# Patient Record
Sex: FEMALE | Race: OTHER | Employment: FULL TIME | URBAN - METROPOLITAN AREA
[De-identification: names, ages, dates, MRNs, and addresses within clinical notes are randomized per-mention and may not be internally consistent; named-entity substitution may affect disease eponyms.]

---

## 2018-07-19 ENCOUNTER — OP HISTORICAL/CONVERTED ENCOUNTER (OUTPATIENT)
Dept: OTHER | Age: 38
End: 2018-07-19

## 2018-07-24 ENCOUNTER — OP HISTORICAL/CONVERTED ENCOUNTER (OUTPATIENT)
Dept: OTHER | Age: 38
End: 2018-07-24

## 2018-08-03 ENCOUNTER — OP HISTORICAL/CONVERTED ENCOUNTER (OUTPATIENT)
Dept: OTHER | Age: 38
End: 2018-08-03

## 2018-08-26 ENCOUNTER — ED HISTORICAL/CONVERTED ENCOUNTER (OUTPATIENT)
Dept: OTHER | Age: 38
End: 2018-08-26

## 2020-03-02 ENCOUNTER — ED HISTORICAL/CONVERTED ENCOUNTER (OUTPATIENT)
Dept: OTHER | Age: 40
End: 2020-03-02

## 2020-11-23 ENCOUNTER — APPOINTMENT (OUTPATIENT)
Dept: GENERAL RADIOLOGY | Age: 40
End: 2020-11-23
Attending: EMERGENCY MEDICINE
Payer: MEDICAID

## 2020-11-23 ENCOUNTER — HOSPITAL ENCOUNTER (EMERGENCY)
Age: 40
Discharge: HOME | End: 2020-11-23
Attending: EMERGENCY MEDICINE
Payer: MEDICAID

## 2020-11-23 VITALS
HEART RATE: 92 BPM | DIASTOLIC BLOOD PRESSURE: 85 MMHG | SYSTOLIC BLOOD PRESSURE: 111 MMHG | BODY MASS INDEX: 34.27 KG/M2 | TEMPERATURE: 97.8 F | WEIGHT: 170 LBS | HEIGHT: 59 IN | RESPIRATION RATE: 18 BRPM | OXYGEN SATURATION: 99 %

## 2020-11-23 DIAGNOSIS — M54.31 RIGHT SIDED SCIATICA: Primary | ICD-10-CM

## 2020-11-23 LAB
APPEARANCE UR: CLEAR
BACTERIA URNS QL MICRO: NEGATIVE /HPF
BILIRUB UR QL: NEGATIVE
COLOR UR: YELLOW
EPITH CASTS URNS QL MICRO: ABNORMAL /LPF
GLUCOSE UR STRIP.AUTO-MCNC: NEGATIVE MG/DL
HCG UR QL: NEGATIVE
HGB UR QL STRIP: NEGATIVE
KETONES UR QL STRIP.AUTO: NEGATIVE MG/DL
LEUKOCYTE ESTERASE UR QL STRIP.AUTO: NEGATIVE
NITRITE UR QL STRIP.AUTO: NEGATIVE
PH UR STRIP: 7 [PH] (ref 5–8)
PROT UR STRIP-MCNC: NEGATIVE MG/DL
RBC #/AREA URNS HPF: ABNORMAL /HPF (ref 0–5)
SP GR UR REFRACTOMETRY: 1.01 (ref 1–1.03)
UA: UC IF INDICATED,UAUC: ABNORMAL
UROBILINOGEN UR QL STRIP.AUTO: 0.1 EU/DL (ref 0.2–1)
WBC URNS QL MICRO: ABNORMAL /HPF (ref 0–4)

## 2020-11-23 PROCEDURE — 74011250636 HC RX REV CODE- 250/636: Performed by: EMERGENCY MEDICINE

## 2020-11-23 PROCEDURE — 99283 EMERGENCY DEPT VISIT LOW MDM: CPT

## 2020-11-23 PROCEDURE — 72100 X-RAY EXAM L-S SPINE 2/3 VWS: CPT

## 2020-11-23 PROCEDURE — 81001 URINALYSIS AUTO W/SCOPE: CPT

## 2020-11-23 PROCEDURE — 81025 URINE PREGNANCY TEST: CPT

## 2020-11-23 PROCEDURE — 96372 THER/PROPH/DIAG INJ SC/IM: CPT

## 2020-11-23 RX ORDER — CYCLOBENZAPRINE HCL 5 MG
5 TABLET ORAL
Qty: 12 TAB | Refills: 0 | Status: SHIPPED | OUTPATIENT
Start: 2020-11-23

## 2020-11-23 RX ORDER — KETOROLAC TROMETHAMINE 30 MG/ML
60 INJECTION, SOLUTION INTRAMUSCULAR; INTRAVENOUS
Status: COMPLETED | OUTPATIENT
Start: 2020-11-23 | End: 2020-11-23

## 2020-11-23 RX ORDER — NAPROXEN 500 MG/1
500 TABLET ORAL 2 TIMES DAILY WITH MEALS
Qty: 20 TAB | Refills: 0 | Status: SHIPPED | OUTPATIENT
Start: 2020-11-23 | End: 2020-12-03

## 2020-11-23 RX ORDER — CYCLOBENZAPRINE HCL 10 MG
10 TABLET ORAL
Status: DISCONTINUED | OUTPATIENT
Start: 2020-11-23 | End: 2020-11-23

## 2020-11-23 RX ORDER — METHYLPREDNISOLONE 4 MG/1
TABLET ORAL
Qty: 1 DOSE PACK | Refills: 0 | Status: SHIPPED | OUTPATIENT
Start: 2020-11-23

## 2020-11-23 RX ADMIN — KETOROLAC TROMETHAMINE 60 MG: 30 INJECTION, SOLUTION INTRAMUSCULAR at 07:04

## 2020-11-23 NOTE — ED TRIAGE NOTES
Pt states pain in lower back onset Thursday evening.  Denies dysuria, hurts worse upon position change and movement

## 2020-11-23 NOTE — LETTER
[address] [place] [address] 
[phone] Work/School Note Date: 11/23/2020 To Whom It May concern: 
 
Eloy Obando was seen and treated today in the emergency room. Eloy Obando may return to work 11/25/20.   
 
Sincerely, 
 
Dr. Yolande Machado

## 2020-11-23 NOTE — LETTER
[address] [address] [place] [id number] [phone] Work/School Note Date: 11/23/2020 To Whom It May concern: 
 
Salena Merida was seen and treated today in the emergency room. Salena Merida may return to work 11/24/20 Dr. Sheila Peck
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
## 2020-11-23 NOTE — DISCHARGE INSTRUCTIONS
Take naproxen (pain relief), flexeril (muscle relaxer), and medrol dose pack (steroids). See PCP if not better in one week. Rest from heavy lifting. Still walk for exercise. Prolonged bed rest can make pain worse. Return to ED if you cannot walk, having numbness/weakness in the leg or groin, have difficulty urinating.

## 2021-06-29 ENCOUNTER — APPOINTMENT (OUTPATIENT)
Dept: CT IMAGING | Age: 41
End: 2021-06-29
Attending: FAMILY MEDICINE
Payer: MEDICAID

## 2021-06-29 ENCOUNTER — HOSPITAL ENCOUNTER (EMERGENCY)
Age: 41
Discharge: HOME | End: 2021-06-29
Attending: FAMILY MEDICINE
Payer: MEDICAID

## 2021-06-29 VITALS
TEMPERATURE: 98.9 F | RESPIRATION RATE: 16 BRPM | BODY MASS INDEX: 38.71 KG/M2 | DIASTOLIC BLOOD PRESSURE: 71 MMHG | SYSTOLIC BLOOD PRESSURE: 104 MMHG | HEART RATE: 98 BPM | WEIGHT: 192 LBS | HEIGHT: 59 IN | OXYGEN SATURATION: 95 %

## 2021-06-29 DIAGNOSIS — M51.26 LUMBAR DISC HERNIATION: ICD-10-CM

## 2021-06-29 DIAGNOSIS — M51.37 DEGENERATIVE DISC DISEASE AT L5-S1 LEVEL: Primary | ICD-10-CM

## 2021-06-29 PROCEDURE — 74011250637 HC RX REV CODE- 250/637: Performed by: FAMILY MEDICINE

## 2021-06-29 PROCEDURE — 72131 CT LUMBAR SPINE W/O DYE: CPT

## 2021-06-29 PROCEDURE — 99283 EMERGENCY DEPT VISIT LOW MDM: CPT

## 2021-06-29 RX ORDER — HYDROCODONE BITARTRATE AND ACETAMINOPHEN 5; 325 MG/1; MG/1
1 TABLET ORAL ONCE
Status: COMPLETED | OUTPATIENT
Start: 2021-06-29 | End: 2021-06-29

## 2021-06-29 RX ORDER — NAPROXEN 500 MG/1
500 TABLET ORAL ONCE
Status: DISCONTINUED | OUTPATIENT
Start: 2021-06-29 | End: 2021-06-29 | Stop reason: HOSPADM

## 2021-06-29 RX ORDER — PREDNISONE 20 MG/1
40 TABLET ORAL DAILY
Qty: 10 TABLET | Refills: 0 | Status: SHIPPED | OUTPATIENT
Start: 2021-06-29 | End: 2021-07-04

## 2021-06-29 RX ORDER — HYDROCODONE BITARTRATE AND ACETAMINOPHEN 5; 325 MG/1; MG/1
1 TABLET ORAL
Qty: 5 TABLET | Refills: 0 | Status: SHIPPED | OUTPATIENT
Start: 2021-06-29 | End: 2021-07-01

## 2021-06-29 RX ADMIN — HYDROCODONE BITARTRATE AND ACETAMINOPHEN 1 TABLET: 5; 325 TABLET ORAL at 13:39

## 2021-06-29 NOTE — ED NOTES
MD ordered Naproxen for pain. Pt refused med, stated that naproxen does not work for her, and asked for something stronger.  Will notify MD of pt request.

## 2021-06-29 NOTE — DISCHARGE INSTRUCTIONS
Thank you! Thank you for allowing me to care for you in the emergency department. I sincerely hope that you are satisfied with your visit today. It is my goal to provide you with excellent care. Below you will find a list of your labs and imaging from your visit today. Should you have any questions regarding these results please do not hesitate to call the emergency department. Labs -   No results found for this or any previous visit (from the past 12 hour(s)). Radiologic Studies -   CT SPINE LUMB WO CONT   Final Result   Mild degenerative disc disease L5-S1 with focal disc protrusion on   the left with possible indentation of the left S1 nerve in the left lateral   recess. Mild degenerative disc disease L4-L5 with mild asymmetric disc protrusion on the   right. No acute or chronic fracture, spondylolysis or spondylolisthesis was   demonstrated. CT Results  (Last 48 hours)                 06/29/21 1417  CT SPINE LUMB WO CONT Final result    Impression:  Mild degenerative disc disease L5-S1 with focal disc protrusion on   the left with possible indentation of the left S1 nerve in the left lateral   recess. Mild degenerative disc disease L4-L5 with mild asymmetric disc protrusion on the   right. No acute or chronic fracture, spondylolysis or spondylolisthesis was   demonstrated. Narrative:  CT SCAN OF THE LUMBAR SPINE WITHOUT CONTRAST:       Clinical History:  Sudden low back pain. Thin axial and coronal and sagittal reconstructions were obtained . Dose Reduction:        All CT scans at this facility are performed using dose reduction optimization   techniques as appropriate to a performed exam including the following: Automated   exposure control, adjustments of the mA and/or kV according to patient size, or   use of iterative reconstruction technique. At the L1-L2 level , mild narrowing of the disc space is noted.  No disc   herniation or central or foraminal stenoses are noted. At the L2-L3 level , mild narrowing of the disc space is noted. No disc   herniation or central or foraminal stenoses are noted. At the L3-L4 level , mild bulging of the disc is noted. No significant central   or foraminal stenoses is noted            At the L4-L5 level , narrowing of the disc space and bulging of the disc is   noted. Mild asymmetric disc protrusion on the right is causing mild foraminal   stenosis. No significant central stenoses is noted. At the L5-S1 level , desiccation and narrowing of the disc space is noted. Bulging disc and mild focal disc protrusion appears to be indenting the left S1   nerve in the left lateral recess and clinical correlation is suggested. No   significant foraminal stenoses is noted. No acute or chronic fracture, spondylolysis or spondylolisthesis is noted. CXR Results  (Last 48 hours)      None               If you feel that you have not received excellent quality care or timely care, please ask to speak to the nurse manager. Please choose us in the future for your continued health care needs. ------------------------------------------------------------------------------------------------------------  The exam and treatment you received in the Emergency Department were for an urgent problem and are not intended as complete care. It is important that you follow-up with a doctor, nurse practitioner, or physician assistant to:  (1) confirm your diagnosis,  (2) re-evaluation of changes in your illness and treatment, and  (3) for ongoing care. If your symptoms become worse or you do not improve as expected and you are unable to reach your usual health care provider, you should return to the Emergency Department. We are available 24 hours a day. Please take your discharge instructions with you when you go to your follow-up appointment.      If you have any problem arranging a follow-up appointment, contact the Emergency Department immediately. If a prescription has been provided, please have it filled as soon as possible to prevent a delay in treatment. Read the entire medication instruction sheet provided to you by the pharmacy. If you have any questions or reservations about taking the medication due to side effects or interactions with other medications, please call your primary care physician or contact the ER to speak with the charge nurse. Make an appointment with your family doctor or the physician you were referred to for follow-up of this visit as instructed on your discharge paperwork, as this is a mandatory follow-up. Return to the ER if you are unable to be seen or if you are unable to be seen in a timely manner. If you have any problem arranging the follow-up visit, contact the Emergency Department immediately.

## 2021-06-29 NOTE — LETTER
[address] [facility] EMERGENCY DEPARTMENT  [address] [place] [address]  [phone]    Work/School Note    Date: 6/29/2021    To Whom It May concern:    Dwight Solano was seen and treated today in the emergency room by the following provider(s):  Attending Provider: Danilo Ivan DO. Dwight Solano is excused from work/school on 6/29/2021 through 7/2/2021. She is medically clear to return to work/school on 7/3/2021.         Sincerely,          Leeanna Chauhan DO

## 2021-06-29 NOTE — ED TRIAGE NOTES
Pt c/o lower back pain since this am. States that she was sweeping and bent over and heard a \"pop\". Pt states that pain also radiates down right leg.

## 2021-07-02 NOTE — ED PROVIDER NOTES
EMERGENCY DEPARTMENT HISTORY AND PHYSICAL EXAM      Date: 2021  Patient Name: Ryne Trinh    History of Presenting Illness     Chief Complaint   Patient presents with    Back Pain       History Provided By:     HPI: Ryne Trinh, is an extremely pleasant 46 y.o. female presenting to the ED with a chief complaint of lower back pain. She states she has lower back pain every day however earlier she was sweeping when she felt an immediate pain in her lower back. She states is very painful when she walks. Diffuse tenderness that is achy in the lower back. No numbness, tingling or weakness in lower extremities. No saddle anesthesia. No changes with urinating or stooling. No fevers, chills. There are no other complaints, changes, or physical findings at this time. PCP: Anya, MD Sharon    No current facility-administered medications on file prior to encounter. Current Outpatient Medications on File Prior to Encounter   Medication Sig Dispense Refill    cyclobenzaprine (FLEXERIL) 5 mg tablet Take 1 Tab by mouth three (3) times daily as needed for Muscle Spasm(s) (drowsy, do not drive after). 12 Tab 0    methylPREDNISolone (Medrol, John,) 4 mg tablet Take according to package instructions 1 Dose Pack 0       Past History     Past Medical History:  Past Medical History:   Diagnosis Date    Disc disorder        Past Surgical History:  Past Surgical History:   Procedure Laterality Date    HX  SECTION      HX TUBAL LIGATION         Family History:  History reviewed. No pertinent family history.     Social History:  Social History     Tobacco Use    Smoking status: Current Every Day Smoker     Packs/day: 0.50    Smokeless tobacco: Never Used   Substance Use Topics    Alcohol use: Never    Drug use: Never       Allergies:  No Known Allergies      Review of Systems     Review of Systems   Constitutional: Negative for activity change, appetite change, chills, fatigue and fever.   HENT: Negative for congestion and sore throat. Eyes: Negative for photophobia and visual disturbance. Respiratory: Negative for cough, shortness of breath and wheezing. Cardiovascular: Negative for chest pain, palpitations and leg swelling. Gastrointestinal: Negative for abdominal pain, diarrhea, nausea and vomiting. Endocrine: Negative for cold intolerance and heat intolerance. Musculoskeletal: Negative for gait problem and joint swelling. See HPI   Skin: Negative for color change and rash. Neurological: Negative for dizziness and headaches. See HPI       Physical Exam     Physical Exam  Constitutional:       Appearance: She is well-developed. HENT:      Head: Normocephalic and atraumatic. Mouth/Throat:      Mouth: Mucous membranes are moist.      Pharynx: Oropharynx is clear. Eyes:      Conjunctiva/sclera: Conjunctivae normal.      Pupils: Pupils are equal, round, and reactive to light. Cardiovascular:      Rate and Rhythm: Normal rate and regular rhythm. Heart sounds: No murmur heard. Pulmonary:      Effort: No respiratory distress. Breath sounds: No stridor. No wheezing, rhonchi or rales. Abdominal:      General: There is no distension. Tenderness: There is no abdominal tenderness. There is no rebound. Musculoskeletal:      Cervical back: Normal range of motion and neck supple. Comments: Mild midline lumbar tenderness as well as lumbar paraspinal muscle tenderness. Muscle strength testing at lower extremities is 5/5   Skin:     General: Skin is warm and dry. Neurological:      General: No focal deficit present. Mental Status: She is alert and oriented to person, place, and time.       Comments: No motor nor sensory deficits of lower extremities, antalgic gait   Psychiatric:         Mood and Affect: Mood normal.         Behavior: Behavior normal.         Lab and Diagnostic Study Results     Labs -   No results found for this or any previous visit (from the past 12 hour(s)). Radiologic Studies -   @lastxrresult@  CT Results  (Last 48 hours)    None        CXR Results  (Last 48 hours)    None            Medical Decision Making   - I am the first provider for this patient. - I reviewed the vital signs, available nursing notes, past medical history, past surgical history, family history and social history. - Initial assessment performed. The patients presenting problems have been discussed, and they are in agreement with the care plan formulated and outlined with them. I have encouraged them to ask questions as they arise throughout their visit. Vital Signs-Reviewed the patient's vital signs. No data found. ED Course/ Provider Notes (Medical Decision Making):     Patient presented to the emergency department with a chief complaint of lower back pain. Exam findings per above. CT demonstrates Mild degenerative disc disease L5-S1 with focal disc protrusion on  the left with possible indentation of the left S1 nerve in the left lateral  recess.     Mild degenerative disc disease L4-L5 with mild asymmetric disc protrusion on the  right.     No acute or chronic fracture, spondylolysis or spondylolisthesis was  Demonstrated. I discussed these findings in detail with the patient. She is feeling much better with pain meds. She was given orthopedic spine information for follow-up. She will also follow-up with her PCP. Jeremi Bean was given a thorough list of signs and symptoms that would warrant an immediate return to the emergency department. Otherwise Jeremi Bean will follow up with PCP. Procedures   Medical Decision Makingedical Decision Making  Performed by: Amor Mccoy DO  Procedures  None       Disposition   Disposition:     Home     All of the diagnostic tests were reviewed and questions answered. Diagnosis, care plan and treatment options were discussed.   The patient understands the instructions and will follow up as directed. The patients results have been reviewed with them. They have been counseled regarding their diagnosis. The patient verbally convey understanding and agreement of the signs, symptoms, diagnosis, treatment and prognosis and additionally agrees to follow up as recommended with their PCP in 24 - 48 hours. They also agree with the care-plan and convey that all of their questions have been answered. I have also put together some discharge instructions for them that include: 1) educational information regarding their diagnosis, 2) how to care for their diagnosis at home, as well a 3) list of reasons why they would want to return to the ED prior to their follow-up appointment, should their condition change. DISCHARGE PLAN:    1. Cannot display discharge medications since this patient is not currently admitted. 2.   Follow-up Information     Follow up With Specialties Details Why Contact Info    Scooter Griffin MD Orthopedic Surgery Schedule an appointment as soon as possible for a visit   [place] [address]  [phone]      Your primary care doctor  Schedule an appointment as soon as possible for a visit               3.  Return to ED if worse       4. Discharge Medication List as of 6/29/2021  3:17 PM      START taking these medications    Details   predniSONE (DELTASONE) 20 mg tablet Take 40 mg by mouth daily for 5 days. With Breakfast, Normal, Disp-10 Tablet, R-0      HYDROcodone-acetaminophen (Norco) 5-325 mg per tablet Take 1 Tablet by mouth every eight (8) hours as needed for Pain for up to 2 days. Max Daily Amount: 3 Tablets., Normal, Disp-5 Tablet, R-0         CONTINUE these medications which have NOT CHANGED    Details   cyclobenzaprine (FLEXERIL) 5 mg tablet Take 1 Tab by mouth three (3) times daily as needed for Muscle Spasm(s) (drowsy, do not drive after). , Normal, Disp-12 Tab,R-0 methylPREDNISolone (Medrol, John,) 4 mg tablet Take according to package instructions, Normal, Disp-1 Dose Pack,R-0               Diagnosis     Clinical Impression:    1. Degenerative disc disease at L5-S1 level    2. Lumbar disc herniation        Attestations:    Tish Ptitman, DO    Please note that this dictation was completed with [employer], the computer voice recognition software. Quite often unanticipated grammatical, syntax, homophones, and other interpretive errors are inadvertently transcribed by the computer software. Please disregard these errors. Please excuse any errors that have escaped final proofreading. Thank you.

## 2022-03-04 ENCOUNTER — HOSPITAL ENCOUNTER (EMERGENCY)
Age: 42
Discharge: HOME | End: 2022-03-04
Payer: MEDICAID

## 2022-03-04 ENCOUNTER — APPOINTMENT (OUTPATIENT)
Dept: ULTRASOUND IMAGING | Age: 42
End: 2022-03-04
Attending: NURSE PRACTITIONER
Payer: MEDICAID

## 2022-03-04 VITALS
TEMPERATURE: 98.6 F | OXYGEN SATURATION: 100 % | HEIGHT: 59 IN | BODY MASS INDEX: 38.3 KG/M2 | WEIGHT: 190 LBS | DIASTOLIC BLOOD PRESSURE: 78 MMHG | SYSTOLIC BLOOD PRESSURE: 112 MMHG | HEART RATE: 74 BPM | RESPIRATION RATE: 18 BRPM

## 2022-03-04 DIAGNOSIS — Z32.01 POSITIVE BLOOD PREGNANCY TEST: Primary | ICD-10-CM

## 2022-03-04 LAB
ABO + RH BLD: NORMAL
ALBUMIN SERPL-MCNC: 4.1 G/DL (ref 3.5–5)
ALBUMIN/GLOB SERPL: 1 {RATIO} (ref 1.1–2.2)
ALP SERPL-CCNC: 144 U/L (ref 45–117)
ALT SERPL-CCNC: 38 U/L (ref 12–78)
ANION GAP SERPL CALC-SCNC: 5 MMOL/L (ref 5–15)
APPEARANCE UR: ABNORMAL
AST SERPL W P-5'-P-CCNC: 18 U/L (ref 15–37)
BACTERIA URNS QL MICRO: NEGATIVE /HPF
BASOPHILS # BLD: 0 K/UL (ref 0–0.1)
BASOPHILS NFR BLD: 0 % (ref 0–1)
BILIRUB SERPL-MCNC: 0.5 MG/DL (ref 0.2–1)
BILIRUB UR QL: NEGATIVE
BUN SERPL-MCNC: 13 MG/DL (ref 6–20)
BUN/CREAT SERPL: 18 (ref 12–20)
CA-I BLD-MCNC: 9.5 MG/DL (ref 8.5–10.1)
CHLORIDE SERPL-SCNC: 104 MMOL/L (ref 97–108)
CO2 SERPL-SCNC: 26 MMOL/L (ref 21–32)
COLOR UR: ABNORMAL
CREAT SERPL-MCNC: 0.74 MG/DL (ref 0.55–1.02)
DIFFERENTIAL METHOD BLD: NORMAL
EOSINOPHIL # BLD: 0.1 K/UL (ref 0–0.4)
EOSINOPHIL NFR BLD: 1 % (ref 0–7)
ERYTHROCYTE [DISTWIDTH] IN BLOOD BY AUTOMATED COUNT: 12.3 % (ref 11.5–14.5)
GLOBULIN SER CALC-MCNC: 4.1 G/DL (ref 2–4)
GLUCOSE SERPL-MCNC: 103 MG/DL (ref 65–100)
GLUCOSE UR STRIP.AUTO-MCNC: NEGATIVE MG/DL
HCG SERPL-ACNC: 7 MIU/ML (ref 0–6)
HCG UR QL: NEGATIVE
HCT VFR BLD AUTO: 42.8 % (ref 35–47)
HGB BLD-MCNC: 14.3 G/DL (ref 11.5–16)
HGB UR QL STRIP: NEGATIVE
IMM GRANULOCYTES # BLD AUTO: 0 K/UL (ref 0–0.04)
IMM GRANULOCYTES NFR BLD AUTO: 0 % (ref 0–0.5)
KETONES UR QL STRIP.AUTO: NEGATIVE MG/DL
LEUKOCYTE ESTERASE UR QL STRIP.AUTO: NEGATIVE
LYMPHOCYTES # BLD: 2.2 K/UL (ref 0.8–3.5)
LYMPHOCYTES NFR BLD: 21 % (ref 12–49)
MCH RBC QN AUTO: 30.2 PG (ref 26–34)
MCHC RBC AUTO-ENTMCNC: 33.4 G/DL (ref 30–36.5)
MCV RBC AUTO: 90.3 FL (ref 80–99)
MONOCYTES # BLD: 0.6 K/UL (ref 0–1)
MONOCYTES NFR BLD: 5 % (ref 5–13)
NEUTS SEG # BLD: 7.6 K/UL (ref 1.8–8)
NEUTS SEG NFR BLD: 73 % (ref 32–75)
NITRITE UR QL STRIP.AUTO: NEGATIVE
NRBC # BLD: 0 K/UL (ref 0–0.01)
NRBC BLD-RTO: 0 PER 100 WBC
PH UR STRIP: 6 [PH] (ref 5–8)
PLATELET # BLD AUTO: 275 K/UL (ref 150–400)
PMV BLD AUTO: 11.1 FL (ref 8.9–12.9)
POTASSIUM SERPL-SCNC: 3.8 MMOL/L (ref 3.5–5.1)
PROT SERPL-MCNC: 8.2 G/DL (ref 6.4–8.2)
PROT UR STRIP-MCNC: NEGATIVE MG/DL
RBC # BLD AUTO: 4.74 M/UL (ref 3.8–5.2)
RBC #/AREA URNS HPF: ABNORMAL /HPF (ref 0–5)
SODIUM SERPL-SCNC: 135 MMOL/L (ref 136–145)
SP GR UR REFRACTOMETRY: 1 (ref 1–1.03)
UA: UC IF INDICATED,UAUC: ABNORMAL
UROBILINOGEN UR QL STRIP.AUTO: 0.1 EU/DL (ref 0.1–1)
WBC # BLD AUTO: 10.5 K/UL (ref 3.6–11)
WBC URNS QL MICRO: ABNORMAL /HPF (ref 0–4)

## 2022-03-04 PROCEDURE — 76830 TRANSVAGINAL US NON-OB: CPT

## 2022-03-04 PROCEDURE — 74011250636 HC RX REV CODE- 250/636: Performed by: NURSE PRACTITIONER

## 2022-03-04 PROCEDURE — 86900 BLOOD TYPING SEROLOGIC ABO: CPT

## 2022-03-04 PROCEDURE — 99284 EMERGENCY DEPT VISIT MOD MDM: CPT

## 2022-03-04 PROCEDURE — 81001 URINALYSIS AUTO W/SCOPE: CPT

## 2022-03-04 PROCEDURE — 81025 URINE PREGNANCY TEST: CPT

## 2022-03-04 PROCEDURE — 80053 COMPREHEN METABOLIC PANEL: CPT

## 2022-03-04 PROCEDURE — 36415 COLL VENOUS BLD VENIPUNCTURE: CPT

## 2022-03-04 PROCEDURE — 84702 CHORIONIC GONADOTROPIN TEST: CPT

## 2022-03-04 PROCEDURE — 85025 COMPLETE CBC W/AUTO DIFF WBC: CPT

## 2022-03-04 PROCEDURE — 76856 US EXAM PELVIC COMPLETE: CPT

## 2022-03-04 PROCEDURE — 96374 THER/PROPH/DIAG INJ IV PUSH: CPT

## 2022-03-04 RX ORDER — ONDANSETRON 2 MG/ML
4 INJECTION INTRAMUSCULAR; INTRAVENOUS
Status: COMPLETED | OUTPATIENT
Start: 2022-03-04 | End: 2022-03-04

## 2022-03-04 RX ADMIN — SODIUM CHLORIDE 1000 ML: 9 INJECTION, SOLUTION INTRAVENOUS at 12:30

## 2022-03-04 RX ADMIN — ONDANSETRON 4 MG: 2 INJECTION INTRAMUSCULAR; INTRAVENOUS at 12:31

## 2022-03-04 NOTE — ED PROVIDER NOTES
EMERGENCY DEPARTMENT HISTORY AND PHYSICAL EXAM      Date: 3/4/2022  Patient Name: Fercho Ely    History of Presenting Illness     Chief Complaint   Patient presents with    Abdominal Pain       History Provided By: Patient    HPI: Fercho Ely, 46 y.o. female with a past medical history significant hyperlipidemia and obesity presents to the ED with cc of positive pregnancy test.  Patient states she had her tubes tied 8 years ago. Patient went to her family practice who found her to have a positive blood pressure pregnancy test.  Patient was then told to come to the ER to evaluate positive pregnancy. Patient has not been sexually active in over 2 months. Moderate severity, no known exacerbating or relieving factors, no other associated signs and symptoms    There are no other complaints, changes, or physical findings at this time. PCP: None    No current facility-administered medications on file prior to encounter. Current Outpatient Medications on File Prior to Encounter   Medication Sig Dispense Refill    cyclobenzaprine (FLEXERIL) 5 mg tablet Take 1 Tab by mouth three (3) times daily as needed for Muscle Spasm(s) (drowsy, do not drive after). 12 Tab 0    methylPREDNISolone (Medrol, John,) 4 mg tablet Take according to package instructions 1 Dose Pack 0       Past History     Past Medical History:  Past Medical History:   Diagnosis Date    Disc disorder        Past Surgical History:  Past Surgical History:   Procedure Laterality Date    HX  SECTION      HX TUBAL LIGATION         Family History:  No family history on file.     Social History:  Social History     Tobacco Use    Smoking status: Current Every Day Smoker     Packs/day: 0.50    Smokeless tobacco: Never Used   Substance Use Topics    Alcohol use: Never    Drug use: Never       Allergies:  No Known Allergies      Review of Systems     Review of Systems   Constitutional: Negative for chills, fatigue and fever.   HENT: Negative for congestion, sinus pressure and trouble swallowing. Eyes: Negative for photophobia and pain. Respiratory: Negative for cough and shortness of breath. Cardiovascular: Negative for chest pain and leg swelling. Gastrointestinal: Negative for abdominal pain, diarrhea, nausea and vomiting. Endocrine: Negative for polydipsia, polyphagia and polyuria. Genitourinary: Negative for decreased urine volume, difficulty urinating, dysuria, hematuria and urgency. Musculoskeletal: Negative for back pain, gait problem, myalgias and neck pain. Skin: Negative for pallor and rash. Allergic/Immunologic: Negative for environmental allergies and food allergies. Neurological: Negative for dizziness, facial asymmetry, speech difficulty, numbness and headaches. Hematological: Negative for adenopathy. Does not bruise/bleed easily. Psychiatric/Behavioral: Negative for agitation, self-injury and suicidal ideas. The patient is not nervous/anxious. Physical Exam     Physical Exam  Vitals and nursing note reviewed. Constitutional:       Appearance: Normal appearance. HENT:      Head: Atraumatic. Right Ear: Tympanic membrane and external ear normal.      Left Ear: Tympanic membrane and external ear normal.      Nose: Nose normal.      Mouth/Throat:      Mouth: Mucous membranes are moist.   Eyes:      Extraocular Movements: Extraocular movements intact. Pupils: Pupils are equal, round, and reactive to light. Cardiovascular:      Rate and Rhythm: Normal rate and regular rhythm. Pulses: Normal pulses. Heart sounds: Normal heart sounds. Pulmonary:      Breath sounds: Normal breath sounds. Abdominal:      General: Abdomen is flat. Bowel sounds are normal.      Palpations: Abdomen is soft. Tenderness: There is generalized abdominal tenderness. Musculoskeletal:         General: Normal range of motion. Cervical back: Normal range of motion and neck supple. Skin:     General: Skin is warm and dry. Capillary Refill: Capillary refill takes less than 2 seconds. Neurological:      General: No focal deficit present. Mental Status: She is alert and oriented to person, place, and time. Mental status is at baseline. Psychiatric:         Mood and Affect: Mood normal.         Behavior: Behavior normal.         Lab and Diagnostic Study Results     Labs -     Recent Results (from the past 12 hour(s))   BLOOD TYPE, (ABO+RH)    Collection Time: 03/04/22 12:04 PM   Result Value Ref Range    ABO/Rh(D) O Positive    METABOLIC PANEL, COMPREHENSIVE    Collection Time: 03/04/22 12:04 PM   Result Value Ref Range    Sodium 135 (L) 136 - 145 mmol/L    Potassium 3.8 3.5 - 5.1 mmol/L    Chloride 104 97 - 108 mmol/L    CO2 26 21 - 32 mmol/L    Anion gap 5 5 - 15 mmol/L    Glucose 103 (H) 65 - 100 mg/dL    BUN 13 6 - 20 mg/dL    Creatinine 0.74 0.55 - 1.02 mg/dL    BUN/Creatinine ratio 18 12 - 20      GFR est AA >60 >60 ml/min/1.73m2    GFR est non-AA >60 >60 ml/min/1.73m2    Calcium 9.5 8.5 - 10.1 mg/dL    Bilirubin, total 0.5 0.2 - 1.0 mg/dL    AST (SGOT) 18 15 - 37 U/L    ALT (SGPT) 38 12 - 78 U/L    Alk. phosphatase 144 (H) 45 - 117 U/L    Protein, total 8.2 6.4 - 8.2 g/dL    Albumin 4.1 3.5 - 5.0 g/dL    Globulin 4.1 (H) 2.0 - 4.0 g/dL    A-G Ratio 1.0 (L) 1.1 - 2.2     CBC WITH AUTOMATED DIFF    Collection Time: 03/04/22 12:04 PM   Result Value Ref Range    WBC 10.5 3.6 - 11.0 K/uL    RBC 4.74 3.80 - 5.20 M/uL    HGB 14.3 11.5 - 16.0 g/dL    HCT 42.8 35.0 - 47.0 %    MCV 90.3 80.0 - 99.0 FL    MCH 30.2 26.0 - 34.0 PG    MCHC 33.4 30.0 - 36.5 g/dL    RDW 12.3 11.5 - 14.5 %    PLATELET 173 056 - 501 K/uL    MPV 11.1 8.9 - 12.9 FL    NRBC 0.0 0.0  WBC    ABSOLUTE NRBC 0.00 0.00 - 0.01 K/uL    NEUTROPHILS 73 32 - 75 %    LYMPHOCYTES 21 12 - 49 %    MONOCYTES 5 5 - 13 %    EOSINOPHILS 1 0 - 7 %    BASOPHILS 0 0 - 1 %    IMMATURE GRANULOCYTES 0 0 - 0.5 %    ABS. NEUTROPHILS 7.6 1.8 - 8.0 K/UL    ABS. LYMPHOCYTES 2.2 0.8 - 3.5 K/UL    ABS. MONOCYTES 0.6 0.0 - 1.0 K/UL    ABS. EOSINOPHILS 0.1 0.0 - 0.4 K/UL    ABS. BASOPHILS 0.0 0.0 - 0.1 K/UL    ABS. IMM. GRANS. 0.0 0.00 - 0.04 K/UL    DF AUTOMATED     BETA HCG, QT    Collection Time: 03/04/22 12:04 PM   Result Value Ref Range    Beta HCG, QT 7.0 (H) 0 - 6 mIU/mL   URINALYSIS W/ REFLEX CULTURE    Collection Time: 03/04/22 12:08 PM    Specimen: Urine   Result Value Ref Range    Color Yellow/Straw      Appearance Turbid (A) Clear      Specific gravity 1.005 1.003 - 1.030      pH (UA) 6.0 5.0 - 8.0      Protein Negative Negative mg/dL    Glucose Negative Negative mg/dL    Ketone Negative Negative mg/dL    Bilirubin Negative Negative      Blood Negative Negative      Urobilinogen 0.1 0.1 - 1.0 EU/dL    Nitrites Negative Negative      Leukocyte Esterase Negative Negative      UA:UC IF INDICATED Culture not indicated by UA result Culture not indicated by UA result      WBC 0-4 0 - 4 /hpf    RBC 0-5 0 - 5 /hpf    Bacteria Negative Negative /hpf   HCG URINE, QL    Collection Time: 03/04/22  2:30 PM   Result Value Ref Range    HCG urine, QL Negative Negative         Radiologic Studies -   @[email or web address]@  CT Results  (Last 48 hours)    None        CXR Results  (Last 48 hours)    None            Medical Decision Making   - I am the first provider for this patient. - I reviewed the vital signs, available nursing notes, past medical history, past surgical history, family history and social history. - Initial assessment performed. The patients presenting problems have been discussed, and they are in agreement with the care plan formulated and outlined with them. I have encouraged them to ask questions as they arise throughout their visit. Vital Signs-Reviewed the patient's vital signs.   Patient Vitals for the past 12 hrs:   Temp Pulse Resp BP SpO2   03/04/22 1619 98.6 °F (37 °C) 74 18 112/78 100 %   03/04/22 1150 98.8 °F (37.1 °C) 73 16 116/85 99 %       Records Reviewed: Nursing Notes and Old Medical Records          ED Course:   Discussed case with OB/GYN. In the setting of a positive serum pregnancy and a negative urine pregnancy patient will follow up outpatient later with OB/GYN. Provider Notes (Medical Decision Making):   Patient presents with a positive pregnancy. Differential diagnosis include pregnancy, ectopic pregnancy, failed pregnancy, threatened . Case was discussed with OB/GYN who will follow her in the office. MDM       Procedures   Medical Decision Makingedical Decision Making  Performed by: Nova Ma NP  PROCEDURES:  Procedures       Disposition   Disposition: DC- Adult Discharges: All of the diagnostic tests were reviewed and questions answered. Diagnosis, care plan and treatment options were discussed. The patient understands the instructions and will follow up as directed. The patients results have been reviewed with them. They have been counseled regarding their diagnosis. The patient verbally convey understanding and agreement of the signs, symptoms, diagnosis, treatment and prognosis and additionally agrees to follow up as recommended with their PCP in 24 - 48 hours. They also agree with the care-plan and convey that all of their questions have been answered. I have also put together some discharge instructions for them that include: 1) educational information regarding their diagnosis, 2) how to care for their diagnosis at home, as well a 3) list of reasons why they would want to return to the ED prior to their follow-up appointment, should their condition change. Discharged    DISCHARGE PLAN:  1. Current Discharge Medication List      CONTINUE these medications which have NOT CHANGED    Details   cyclobenzaprine (FLEXERIL) 5 mg tablet Take 1 Tab by mouth three (3) times daily as needed for Muscle Spasm(s) (drowsy, do not drive after).   Qty: 12 Tab, Refills: 0 methylPREDNISolone (Medrol, John,) 4 mg tablet Take according to package instructions  Qty: 1 Dose Pack, Refills: 0           2. Follow-up Information     Follow up With Specialties Details Why Contact Kezia Barnes MD Obstetrics & Gynecology Schedule an appointment as soon as possible for a visit   [id number]  [phone]          3. Return to ED if worse   4. Discharge Medication List as of 3/4/2022  3:56 PM            Diagnosis     Clinical Impression:   1. Positive blood pregnancy test        Attestations:    Bassam Zhao NP    Please note that this dictation was completed with [employer], the [employer] voice recognition software. Quite often unanticipated grammatical, syntax, homophones, and other interpretive errors are inadvertently transcribed by the computer software. Please disregard these errors. Please excuse any errors that have escaped final proofreading. Thank you.

## 2022-03-04 NOTE — ED TRIAGE NOTES
Pt with n/v/d that began a few days ago. C/o abd pain. States was called today and notified that her pregnancy test was positive -- had tubes tied 8 yrs ago.

## 2023-05-23 RX ORDER — CYCLOBENZAPRINE HCL 5 MG
5 TABLET ORAL 3 TIMES DAILY PRN
COMMUNITY
Start: 2020-11-23

## 2023-05-23 RX ORDER — METHYLPREDNISOLONE 4 MG/1
TABLET ORAL
COMMUNITY
Start: 2020-11-23